# Patient Record
Sex: FEMALE
[De-identification: names, ages, dates, MRNs, and addresses within clinical notes are randomized per-mention and may not be internally consistent; named-entity substitution may affect disease eponyms.]

---

## 2018-07-17 ENCOUNTER — HOSPITAL ENCOUNTER (OUTPATIENT)
Dept: HOSPITAL 31 - C.ENDO | Age: 53
Discharge: HOME | End: 2018-07-17
Attending: SPECIALIST
Payer: COMMERCIAL

## 2018-07-17 VITALS
RESPIRATION RATE: 19 BRPM | DIASTOLIC BLOOD PRESSURE: 74 MMHG | HEART RATE: 63 BPM | OXYGEN SATURATION: 95 % | SYSTOLIC BLOOD PRESSURE: 109 MMHG

## 2018-07-17 VITALS — TEMPERATURE: 98 F

## 2018-07-17 DIAGNOSIS — K44.9: ICD-10-CM

## 2018-07-17 DIAGNOSIS — B96.81: ICD-10-CM

## 2018-07-17 DIAGNOSIS — K29.60: Primary | ICD-10-CM

## 2018-07-17 DIAGNOSIS — K20.9: ICD-10-CM

## 2018-07-17 PROCEDURE — 88305 TISSUE EXAM BY PATHOLOGIST: CPT

## 2018-07-17 PROCEDURE — 88342 IMHCHEM/IMCYTCHM 1ST ANTB: CPT

## 2018-07-17 PROCEDURE — 43239 EGD BIOPSY SINGLE/MULTIPLE: CPT

## 2025-03-26 NOTE — CP.SDSHP
Same Day Surgery [facility]





- History


Proposed Procedure: EGD


Pre-Op Diagnosis: SEE NOTES





- Previous Medical/Surgical History


Neuro: Other


Misc: Other


Pain: 4.Moderate Pain





- Allergies


Allergies: 


Allergies





No Known Allergies Allergy (Verified 07/17/18 08:59)


 











- Physical Exam


General Appearance: N


Vital Signs: 


 Vital Signs











  07/17/18





  08:25


 


Temperature 98.4 F


 


Pulse Rate 72


 


Respiratory 19





Rate 


 


Blood Pressure 125/84


 


O2 Sat by Pulse 97





Oximetry 











Mental Status: Alert & Oriented x3


Neuro: WNL


Heart: WNL


Lungs: WNL


GI: Other





- {Optional Preform as Required}


Breast: WNL


Abdomen: Other


Rectal: Other


Integument: WNL


: WNL


Ortho: WNL


ENT: WNL





- Impression


Pt. Evaluated Today:Candidate for Anesthesia & Procedure: Yes





- Date & Time


Time: 10:49





Short Stay Discharge





- Short Stay Discharge


Admitting Diagnosis/Reason for Visit: DYSPEPSIA


Disposition: HOME/ ROUTINE none